# Patient Record
Sex: FEMALE
[De-identification: names, ages, dates, MRNs, and addresses within clinical notes are randomized per-mention and may not be internally consistent; named-entity substitution may affect disease eponyms.]

---

## 2021-10-18 ENCOUNTER — LABORATORY RESULT (OUTPATIENT)
Age: 51
End: 2021-10-18

## 2021-10-18 ENCOUNTER — TRANSCRIPTION ENCOUNTER (OUTPATIENT)
Age: 51
End: 2021-10-18

## 2021-10-18 ENCOUNTER — APPOINTMENT (OUTPATIENT)
Dept: FAMILY MEDICINE | Facility: CLINIC | Age: 51
End: 2021-10-18
Payer: COMMERCIAL

## 2021-10-18 VITALS
TEMPERATURE: 97.6 F | DIASTOLIC BLOOD PRESSURE: 70 MMHG | SYSTOLIC BLOOD PRESSURE: 114 MMHG | OXYGEN SATURATION: 99 % | WEIGHT: 135 LBS | HEIGHT: 65 IN | BODY MASS INDEX: 22.49 KG/M2 | HEART RATE: 63 BPM

## 2021-10-18 DIAGNOSIS — Z63.4 DISAPPEARANCE AND DEATH OF FAMILY MEMBER: ICD-10-CM

## 2021-10-18 DIAGNOSIS — Z82.69 FAMILY HISTORY OF OTHER DISEASES OF THE MUSCULOSKELETAL SYSTEM AND CONNECTIVE TISSUE: ICD-10-CM

## 2021-10-18 DIAGNOSIS — Z82.49 FAMILY HISTORY OF ISCHEMIC HEART DISEASE AND OTHER DISEASES OF THE CIRCULATORY SYSTEM: ICD-10-CM

## 2021-10-18 DIAGNOSIS — Z81.8 FAMILY HISTORY OF OTHER MENTAL AND BEHAVIORAL DISORDERS: ICD-10-CM

## 2021-10-18 DIAGNOSIS — U07.1 COVID-19: ICD-10-CM

## 2021-10-18 DIAGNOSIS — Z86.010 PERSONAL HISTORY OF COLONIC POLYPS: ICD-10-CM

## 2021-10-18 DIAGNOSIS — B00.2 HERPESVIRAL GINGIVOSTOMATITIS AND PHARYNGOTONSILLITIS: ICD-10-CM

## 2021-10-18 DIAGNOSIS — Z82.3 FAMILY HISTORY OF STROKE: ICD-10-CM

## 2021-10-18 PROCEDURE — 36415 COLL VENOUS BLD VENIPUNCTURE: CPT

## 2021-10-18 PROCEDURE — 99386 PREV VISIT NEW AGE 40-64: CPT | Mod: 25

## 2021-10-18 PROCEDURE — 99000 SPECIMEN HANDLING OFFICE-LAB: CPT

## 2021-10-18 SDOH — SOCIAL STABILITY - SOCIAL INSECURITY: DISSAPEARANCE AND DEATH OF FAMILY MEMBER: Z63.4

## 2021-10-18 NOTE — ASSESSMENT
[FreeTextEntry1] : MAYTE LYN is a 51 year old female here for a physical exam and to establish primary care and for f/u of above noted medical issues. \par \par

## 2021-10-18 NOTE — PHYSICAL EXAM

## 2021-10-18 NOTE — HISTORY OF PRESENT ILLNESS
[de-identified] : Her last PE was last year\par \par Her last tetanus shot was \par Shingrix -- never\par Had Moderna COVID vacc series\par \par Her last dentist visit was within past 6 months\par Her last eye doctor appointment was within past year\par \par Her diet is clean/healthful overall usually with occas periods of indulgence\par Exercises regularly \par \par Her last colonoscopy was 50-51 yrs of age, polyps, rpt due by 5 yrs. Also had EGD and was wnl, Dr. Bliss \par Her last mammogram was 2020 ([facility]), plans for near future \par Her last gyn exam was 2021 (Dr. Booth)-- she has Rx for labs to be done in near future incl hormone levels and CBC/iron so we will do today with her labs\par \par Has been told she is not anemic but has low iron and this is likely related to heavier periods for years. UTD on colonoscopy. Still getting menses Q1-2 months. Menses getting a bit less heavy now but still about 3 days of heavy flow and then 2-3 days of lighter flow. Gyn has d/w her ablation vs. IUD as treatment option but she prefers to take a more conservative approach and not add meds/do procedures at this point as is close to menopause \par \par Has migraines, mainly triggered by menstrual cycles. takes Zomig and Cambia ASAP at onset of migraines (approx 2x/month). Has tried Magnesium in past but not very helpful \par \par Has oral HSV and it sounds like she takes Valacyclovir 500 mg BID x 3-5 days and starts treating at first sign of tingling but usually still gets a visible sore so we disc option of trying 1 gm 2 po BID and she is willing to do this. Also occas takes L Lysine \par \par Her   of complications from COVID in 2020. 17 yo daughter at [facility]. No grief therapy. Exercising regularly. No yoga or meditation and we disc those and she might consider. Also might consider therapy  \par \par Several years ago (in her early 50s) she saw Dr. Ileana Peraza as Dr. Murray noted an enlarged thyroid gland. Dr. Peraza die thyroid US and showed a nodule that did not require biopsy. TFTs were wnl at that time. Has not had f/u since then

## 2021-10-18 NOTE — PLAN
[FreeTextEntry1] : Reviewed age-appropriate preventive screening tests with patient. UTD on gyn, colonoscopy. Due for mammogram and plans for near future. Had ECG last year and was wnl so she defers on this for this year. \par \par Shingrix and flu vacc revd/recommended. She defers for now but can RTO if/when reconsiders/checks with insurance about coverage. \par \par Thyroid US ordered to f/u on prior thyroid nodule and will check TSH with labs. If US is reassuring (<1-1.5 cm, benign appearance) and TSH wnl ok to defer on f/u with Endo unless she wishes to do (she prefers to defer on addtl medical visits unless labs/US show need to go and in that case she would definitely go for f/u)\par \par Discussed clean eating (eg Mediterranean style eating plan) and regular exercise/staying as physically active as possible.\par \par Reviewed importance of good self care (eg meditation, yoga, adequate rest, regular exercise, magnesium, clean eating etc). Consider grief therapy. \par \par Treat at first symptom of oral HSV outbreak. Valtrex dosing revd, and can also try L Lysine. Common triggers revd and she tries to avoid these. \par \par Revd common triggers for migraine and she tries to get good rest and eat cleanly and keep stress levels in check and stay well hydrated. Cont meds as needed. Consider trial of Migralief and/or butterbur.\par \par Eat iron rich foods regularly, consider use of OTC iron supplement any day has menstrual bleeding if current labs show low iron levels. Consider IUD (3 yr Kyleena would likely be enough to get her to menopause) if signif abnormal labs or if she would like to treat menorrhagia and she can f/u with gyn if desires this. \par \par Next PE in 1-2 yrs.

## 2021-10-18 NOTE — REVIEW OF SYSTEMS
[Negative] : Heme/Lymph [Nasal Discharge] : no nasal discharge [Sore Throat] : no sore throat [Postnasal Drip] : no postnasal drip [Headache] : headache [Dizziness] : no dizziness [Fainting] : no fainting [Unsteady Walking] : no ataxia [Anxiety] : no anxiety [Depression] : no depression [FreeTextEntry4] : oral HSV, approx 3-4 outbreaks per year [de-identified] : walks in [facility] and uses tick prevention measures but would like Lyme test with labs. No acute Lyme sxs or recent known tick bites [de-identified] : migraines approx 2x/mo, see HPI [de-identified] : +grief related to her  dying of complications from COVID in 4/2020, see HPI

## 2021-10-19 ENCOUNTER — TRANSCRIPTION ENCOUNTER (OUTPATIENT)
Age: 51
End: 2021-10-19

## 2021-10-19 LAB
ALBUMIN SERPL ELPH-MCNC: 4.7 G/DL
ALP BLD-CCNC: 54 U/L
ALT SERPL-CCNC: 12 U/L
ANION GAP SERPL CALC-SCNC: 10 MMOL/L
AST SERPL-CCNC: 11 U/L
B BURGDOR IGG+IGM SER QL IB: NORMAL
BASOPHILS # BLD AUTO: 0.1 K/UL
BASOPHILS NFR BLD AUTO: 1.3 %
BILIRUB SERPL-MCNC: <0.2 MG/DL
BUN SERPL-MCNC: 18 MG/DL
CALCIUM SERPL-MCNC: 9.9 MG/DL
CHLORIDE SERPL-SCNC: 106 MMOL/L
CHOLEST SERPL-MCNC: 186 MG/DL
CO2 SERPL-SCNC: 25 MMOL/L
CREAT SERPL-MCNC: 0.91 MG/DL
EOSINOPHIL # BLD AUTO: 0.17 K/UL
EOSINOPHIL NFR BLD AUTO: 2.2 %
ESTRADIOL SERPL-MCNC: 140 PG/ML
FERRITIN SERPL-MCNC: 13 NG/ML
FSH SERPL-MCNC: 3.9 IU/L
GLUCOSE SERPL-MCNC: 91 MG/DL
HCT VFR BLD CALC: 41.1 %
HDLC SERPL-MCNC: 86 MG/DL
HGB BLD-MCNC: 12.3 G/DL
IMM GRANULOCYTES NFR BLD AUTO: 0.3 %
IRON SATN MFR SERPL: 6 %
IRON SERPL-MCNC: 27 UG/DL
LDLC SERPL CALC-MCNC: 91 MG/DL
LYMPHOCYTES # BLD AUTO: 1.46 K/UL
LYMPHOCYTES NFR BLD AUTO: 18.9 %
MAN DIFF?: NORMAL
MCHC RBC-ENTMCNC: 27.8 PG
MCHC RBC-ENTMCNC: 29.9 GM/DL
MCV RBC AUTO: 92.8 FL
MONOCYTES # BLD AUTO: 0.36 K/UL
MONOCYTES NFR BLD AUTO: 4.7 %
NEUTROPHILS # BLD AUTO: 5.62 K/UL
NEUTROPHILS NFR BLD AUTO: 72.6 %
NONHDLC SERPL-MCNC: 100 MG/DL
PLATELET # BLD AUTO: 306 K/UL
POTASSIUM SERPL-SCNC: 5.2 MMOL/L
PROT SERPL-MCNC: 6.5 G/DL
RBC # BLD: 4.43 M/UL
RBC # FLD: 13.8 %
SODIUM SERPL-SCNC: 141 MMOL/L
TIBC SERPL-MCNC: 432 UG/DL
TRIGL SERPL-MCNC: 47 MG/DL
TSH SERPL-ACNC: 0.78 UIU/ML
UIBC SERPL-MCNC: 405 UG/DL
WBC # FLD AUTO: 7.73 K/UL

## 2022-01-04 ENCOUNTER — FORM ENCOUNTER (OUTPATIENT)
Age: 52
End: 2022-01-04

## 2022-01-04 DIAGNOSIS — Z86.39 PERSONAL HISTORY OF OTHER ENDOCRINE, NUTRITIONAL AND METABOLIC DISEASE: ICD-10-CM

## 2022-01-10 ENCOUNTER — TRANSCRIPTION ENCOUNTER (OUTPATIENT)
Age: 52
End: 2022-01-10

## 2022-01-10 PROBLEM — Z86.39 HISTORY OF THYROID NODULE: Status: RESOLVED | Noted: 2021-10-18 | Resolved: 2022-01-10

## 2022-01-12 ENCOUNTER — NON-APPOINTMENT (OUTPATIENT)
Age: 52
End: 2022-01-12

## 2022-05-12 ENCOUNTER — APPOINTMENT (OUTPATIENT)
Dept: ORTHOPEDIC SURGERY | Facility: CLINIC | Age: 52
End: 2022-05-12

## 2022-05-19 ENCOUNTER — EMERGENCY (EMERGENCY)
Facility: HOSPITAL | Age: 52
LOS: 0 days | Discharge: HOME | End: 2022-05-19
Attending: EMERGENCY MEDICINE
Payer: COMMERCIAL

## 2022-05-19 VITALS — HEIGHT: 64 IN | WEIGHT: 139.99 LBS

## 2022-05-19 VITALS
RESPIRATION RATE: 16 BRPM | SYSTOLIC BLOOD PRESSURE: 119 MMHG | OXYGEN SATURATION: 98 % | HEART RATE: 69 BPM | TEMPERATURE: 98 F | DIASTOLIC BLOOD PRESSURE: 79 MMHG

## 2022-05-19 DIAGNOSIS — R91.8 OTHER NONSPECIFIC ABNORMAL FINDING OF LUNG FIELD: ICD-10-CM

## 2022-05-19 DIAGNOSIS — S09.90XA UNSPECIFIED INJURY OF HEAD, INITIAL ENCOUNTER: ICD-10-CM
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
## 2022-05-19 DIAGNOSIS — S06.0X0A CONCUSSION WITHOUT LOSS OF CONSCIOUSNESS, INITIAL ENCOUNTER: ICD-10-CM

## 2022-05-19 PROCEDURE — 70450 CT HEAD/BRAIN W/O DYE: CPT | Mod: 26,MA

## 2022-05-19 PROCEDURE — 72125 CT NECK SPINE W/O DYE: CPT | Mod: MA

## 2022-05-19 PROCEDURE — 71250 CT THORAX DX C-: CPT | Mod: MA

## 2022-05-19 PROCEDURE — 71250 CT THORAX DX C-: CPT | Mod: 26,MA

## 2022-05-19 PROCEDURE — 99284 EMERGENCY DEPT VISIT MOD MDM: CPT | Mod: 25

## 2022-05-19 PROCEDURE — 72125 CT NECK SPINE W/O DYE: CPT | Mod: 26,MA

## 2022-05-19 PROCEDURE — 99285 EMERGENCY DEPT VISIT HI MDM: CPT

## 2022-05-19 PROCEDURE — 70450 CT HEAD/BRAIN W/O DYE: CPT | Mod: MA

## 2022-05-19 NOTE — ED STATDOCS - ATTENDING APP SHARED VISIT CONTRIBUTION OF CARE
I, Chelsea Mercado MD,  performed the initial face to face bedside interview with this patient regarding history of present illness, review of symptoms and relevant past medical, social and family history.  I completed an independent physical examination.  I was the initial provider who evaluated this patient.   I personally saw the patient and performed a substantive portion of the visit including all aspects of the medical decision making.  I have signed out the follow up of any pending tests (i.e. labs, radiological studies) to the DIPTI.  I have communicated the patient’s plan of care and disposition with the DIPTI.  The history, relevant review of systems, past medical and surgical history, medical decision making, and physical examination was documented by the scribe in my presence and I attest to the accuracy of the documentation.

## 2022-05-19 NOTE — ED STATDOCS - PATIENT PORTAL LINK FT
You can access the FollowMyHealth Patient Portal offered by [facility] by registering at the following website: [email or web address]. By joining [facility]’s FollowMyHealth portal, you will also be able to view your health information using other applications (apps) compatible with our system.

## 2022-05-19 NOTE — ED STATDOCS - NS ED ATTENDING STATEMENT MOD
This was a shared visit with the DIPTI. I reviewed and verified the documentation and independently performed the documented:
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
## 2022-05-19 NOTE — ED STATDOCS - CARE PLAN
1 Principal Discharge DX:	Head injury  Secondary Diagnosis:	Concussion   Principal Discharge DX:	Head injury  Secondary Diagnosis:	Concussion  Secondary Diagnosis:	Opacity noted on imaging study
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
## 2022-05-19 NOTE — ED STATDOCS - NEUROLOGICAL, MLM
sensation is normal and strength is normal. CN 2-12 intact. 5/5 strength throughout. -Romberg, finger to nose normal, rapid alternating movements intact.

## 2022-05-19 NOTE — ED STATDOCS - CARE PROVIDER_API CALL
Danie Mercado; PhD)  Neurosurgery  [address], [address]nd [place] [address]  Phone: [phone]  Fax: [phone]  Follow Up Time:
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
## 2022-06-17 ENCOUNTER — NON-APPOINTMENT (OUTPATIENT)
Age: 52
End: 2022-06-17

## 2022-06-17 PROBLEM — Z78.9 OTHER SPECIFIED HEALTH STATUS: Chronic | Status: ACTIVE | Noted: 2022-05-22

## 2022-08-03 ENCOUNTER — APPOINTMENT (OUTPATIENT)
Dept: PULMONOLOGY | Facility: CLINIC | Age: 52
End: 2022-08-03

## 2022-08-04 ENCOUNTER — APPOINTMENT (OUTPATIENT)
Dept: PULMONOLOGY | Facility: CLINIC | Age: 52
End: 2022-08-04

## 2022-08-04 ENCOUNTER — NON-APPOINTMENT (OUTPATIENT)
Age: 52
End: 2022-08-04

## 2022-08-04 VITALS
TEMPERATURE: 97.3 F | WEIGHT: 135 LBS | SYSTOLIC BLOOD PRESSURE: 122 MMHG | DIASTOLIC BLOOD PRESSURE: 74 MMHG | RESPIRATION RATE: 16 BRPM | OXYGEN SATURATION: 98 % | HEART RATE: 67 BPM | HEIGHT: 64 IN | BODY MASS INDEX: 23.05 KG/M2

## 2022-08-04 PROCEDURE — 94010 BREATHING CAPACITY TEST: CPT

## 2022-08-04 PROCEDURE — 99204 OFFICE O/P NEW MOD 45 MIN: CPT | Mod: 25

## 2022-08-04 NOTE — PROCEDURE
[FreeTextEntry1] : SPI performed in office show normal spirometry\par FEV: 94\par FEV1/FVC Ratio: 93\par WGI55-02%: 88\par \par

## 2022-08-04 NOTE — PHYSICAL EXAM
[No Acute Distress] : no acute distress [Normal Oropharynx] : normal oropharynx [II] : Mallampati Class: II [Normal Appearance] : normal appearance [Normal Rate/Rhythm] : normal rate/rhythm [Normal S1, S2] : normal s1, s2 [No Resp Distress] : no resp distress [Clear to Auscultation Bilaterally] : clear to auscultation bilaterally [No Abnormalities] : no abnormalities [Benign] : benign [Normal Color/ Pigmentation] : normal color/ pigmentation [No Focal Deficits] : no focal deficits [Oriented x3] : oriented x3 [Normal Affect] : normal affect

## 2022-08-04 NOTE — HISTORY OF PRESENT ILLNESS
[Never] : never [TextBox_4] : Ms. Mckinney is 52 year old female with history of migraine HA, thyroid nodule, MEÑO who presents to office for initial pulmonary evaluation. \par \par Her chief concern is abnormal chest CT.\par \par Patient reports she was evaluated in ER for possible concussion on 5/19/2022. CT of Brain showed abnormality of left upper lobe. Patient had CT chest which showed dense opacity in left upper lobe and was advised to get pulmonary evaluation. She states she is in normal state of health. \par \par Patient is COVID vaccinated and boosted. She reports COVID infection X 2 3/2020 and 7/2022 with mild symptoms. \par \par Patient denies seasonal allergies or GERD. She reports she sleeps for 6-7 hours and feels rested, appetite is good and weight is stable. \par \par Patient denies SOB@ rest or exertion, fever, chills, cough, wheezing, nasal congestion, runny nose or heart burn. \par \par

## 2022-08-04 NOTE — ASSESSMENT
[FreeTextEntry1] : Ms. Mckinney is 52 year old female with history of migraine HA, thyroid nodule, MEÑO who presents to office for initial pulmonary evaluation. \par \par Her chief concern is abnormal chest CT.\par \par 1.Abnormal Chest CT\par -add nodify\par -add PET CT (list of [facility] imaging centers given at checkout)\par -add lab ( list of [facility] labs given at checkout)\par \par Discussed treatment plan with Dr. Norton, he is in agreement with treatment plan. \par \par Patient to follow up 2 months.\par Patient to call with further questions and concerns.\par Patient verbalizes understanding of care plan and is agreeable.\par

## 2022-08-08 LAB
M TB IFN-G BLD-IMP: NEGATIVE
QUANTIFERON TB PLUS MITOGEN MINUS NIL: 4.85 IU/ML
QUANTIFERON TB PLUS NIL: 0.02 IU/ML
QUANTIFERON TB PLUS TB1 MINUS NIL: 0.03 IU/ML
QUANTIFERON TB PLUS TB2 MINUS NIL: 0.01 IU/ML

## 2022-08-09 ENCOUNTER — TRANSCRIPTION ENCOUNTER (OUTPATIENT)
Age: 52
End: 2022-08-09

## 2022-08-09 ENCOUNTER — NON-APPOINTMENT (OUTPATIENT)
Age: 52
End: 2022-08-09

## 2022-08-15 ENCOUNTER — TRANSCRIPTION ENCOUNTER (OUTPATIENT)
Age: 52
End: 2022-08-15

## 2022-08-18 ENCOUNTER — TRANSCRIPTION ENCOUNTER (OUTPATIENT)
Age: 52
End: 2022-08-18

## 2022-08-30 ENCOUNTER — NON-APPOINTMENT (OUTPATIENT)
Age: 52
End: 2022-08-30

## 2022-09-08 ENCOUNTER — APPOINTMENT (OUTPATIENT)
Dept: NUCLEAR MEDICINE | Facility: CLINIC | Age: 52
End: 2022-09-08

## 2022-09-23 ENCOUNTER — APPOINTMENT (OUTPATIENT)
Dept: NUCLEAR MEDICINE | Facility: CLINIC | Age: 52
End: 2022-09-23

## 2022-09-30 ENCOUNTER — APPOINTMENT (OUTPATIENT)
Dept: NUCLEAR MEDICINE | Facility: CLINIC | Age: 52
End: 2022-09-30

## 2022-09-30 ENCOUNTER — OUTPATIENT (OUTPATIENT)
Dept: OUTPATIENT SERVICES | Facility: HOSPITAL | Age: 52
LOS: 1 days | End: 2022-09-30

## 2022-09-30 DIAGNOSIS — R93.89 ABNORMAL FINDINGS ON DIAGNOSTIC IMAGING OF OTHER SPECIFIED BODY STRUCTURES: ICD-10-CM

## 2022-09-30 PROCEDURE — 78815 PET IMAGE W/CT SKULL-THIGH: CPT | Mod: 26,PI

## 2022-10-04 ENCOUNTER — APPOINTMENT (OUTPATIENT)
Dept: PULMONOLOGY | Facility: CLINIC | Age: 52
End: 2022-10-04

## 2022-10-04 VITALS
SYSTOLIC BLOOD PRESSURE: 120 MMHG | OXYGEN SATURATION: 98 % | DIASTOLIC BLOOD PRESSURE: 78 MMHG | WEIGHT: 140 LBS | HEART RATE: 62 BPM | HEIGHT: 64 IN | RESPIRATION RATE: 16 BRPM | BODY MASS INDEX: 23.9 KG/M2 | TEMPERATURE: 97.6 F

## 2022-10-04 PROCEDURE — 99214 OFFICE O/P EST MOD 30 MIN: CPT

## 2022-10-04 NOTE — ASSESSMENT
[FreeTextEntry1] : Ms. Mckinney is 52 year old female with history of migraine HA, thyroid nodule, MEÑO who presents to office for follow up pulmonary evaluation. \par \par Her chief concern is follow up.\par \par 1.Abnormal Chest CT/ pulmonary nodules\par -CTS evaluation \par (Dr. Minor contact info given to patient) \par \par 2. Enlarged Uterus\par -advised patient to follow up with GYN. \par \par Patient to follow up 6 months.\par Patient to call with further questions and concerns.\par Patient verbalizes understanding of care plan and is agreeable.\par

## 2022-10-04 NOTE — DISCUSSION/SUMMARY
[FreeTextEntry1] : 8/4/2022 -TB - negative.\par \par 8/9/2022 - Nodify - 10% risk of malignancy \par \par 9/30/2022 - PET CT - mildly FDG avid irregular JAYA opacity, unchanged and better evaluated on comparison CT. Differential includes benign, and malignant etiologies. \par Enlarged uterus with nonspecific minimal diffuse FDG activity in the uterine cavity.

## 2022-10-04 NOTE — HISTORY OF PRESENT ILLNESS
(0) independent [Never] : never [TextBox_4] : Ms. Mckinney is 52 year old female with history of migraine HA, thyroid nodule, MEÑO who presents to office for follow up pulmonary evaluation. \par \par Her chief concern is follow up.\par \par Patient reports she had labs done and received results. She reports she also had PET CT done. Patient reports she is in normal state of health. She reports sleeps well, appetite is good and weight is stable. Patient reports she received EOB for labs test and has cleared issue with Nodify. \par \par Patient denies SOB@ rest or exertion, fever, chills, cough, wheezing, nasal congestion, runny nose or heart burn. \par

## 2022-10-07 ENCOUNTER — TRANSCRIPTION ENCOUNTER (OUTPATIENT)
Age: 52
End: 2022-10-07

## 2022-10-10 ENCOUNTER — TRANSCRIPTION ENCOUNTER (OUTPATIENT)
Age: 52
End: 2022-10-10

## 2022-10-27 ENCOUNTER — NON-APPOINTMENT (OUTPATIENT)
Age: 52
End: 2022-10-27

## 2022-10-27 ENCOUNTER — APPOINTMENT (OUTPATIENT)
Dept: THORACIC SURGERY | Facility: CLINIC | Age: 52
End: 2022-10-27

## 2022-10-27 VITALS
OXYGEN SATURATION: 98 % | HEIGHT: 64 IN | SYSTOLIC BLOOD PRESSURE: 138 MMHG | BODY MASS INDEX: 23.9 KG/M2 | HEART RATE: 63 BPM | WEIGHT: 140 LBS | DIASTOLIC BLOOD PRESSURE: 83 MMHG

## 2022-10-27 DIAGNOSIS — G43.909 MIGRAINE, UNSPECIFIED, NOT INTRACTABLE, W/OUT STATUS MIGRAINOSUS: ICD-10-CM

## 2022-10-27 DIAGNOSIS — Z77.22 CONTACT WITH AND (SUSPECTED) EXPOSURE TO ENVIRONMENTAL TOBACCO SMOKE (ACUTE) (CHRONIC): ICD-10-CM

## 2022-10-27 DIAGNOSIS — Z80.1 FAMILY HISTORY OF MALIGNANT NEOPLASM OF TRACHEA, BRONCHUS AND LUNG: ICD-10-CM

## 2022-10-27 DIAGNOSIS — G37.3 ACUTE TRANSVERSE MYELITIS IN DEMYELINATING DISEASE OF CENTRAL NERVOUS SYSTEM: ICD-10-CM

## 2022-10-27 PROCEDURE — 99205 OFFICE O/P NEW HI 60 MIN: CPT

## 2022-10-28 NOTE — DATA REVIEWED
[FreeTextEntry1] : I have independently reviewed the following:\par CT Chest on 5/19/22\par Spirometry on 8/4/22: %, FEV1 94%.\par PET/CT on 9/30/22

## 2022-10-28 NOTE — ASSESSMENT
[FreeTextEntry1] : Ms. MAYTE LYN, 52 year old female, never a smoker, but history of second hand exposure as a child, Migraine headaches, Transverse Myelitis (2014), thyroid nodules (followed by PCP). May, 2022 incidentally found to have JAYA opacity during CT workup for fall. \par \par CT Chest on 5/19/22:\par - irregular JAYA opacity measuring 1.4 x 1 x 1.8cm with minimal adjacent groundglass associated w/ a cluster of few cysts along the inferior margin of this opacity some of which have a tubular appearance, likely representing bronchiectasis\par \par Spirometry on 8/4/22: %, FEV1 94%.\par \par PET/CT on 9/30/22:\par - 1.4 x 1 x 1.8cm SUV=2.7 JAYA nodule (image 61)\par - enlarged uterus w/ nonspecific minimal diffuse FDG activity in the uterine cavity\par \par I have reviewed the patient's medical records and diagnostic images at time of this office consultation and have made the following recommendation:\par 1. JAYA nodule is suspicious. Discussed options of CT guided needle biopsy versus surgical biopsy for diagnosis.  Risks/benefits of both reviewed. Discussed risk of possible disease progression without definitive diagnosis and further treatment. At this time, patient would like to consider all options, and will contact office if she decides to proceed with care. \par 2. If proceeding with surgery, will book for Robotic assisted Left VATS, Left upper lobe wedge, possible segmentectomy, possible lobectomy. Medical clearance as well as PFTs required prior to surgery. \par \par Recommendations reviewed with patient during this office visit, and all questions answered; Patient instructed on the importance of follow up and verbalizes understanding.\par \par \par I personally performed the services described in the documentation, reviewed the documentation recorded by the scribe in my presence and it accurately and completely records my words and actions.\par \par I, Bebe Barajas, ANP-C, am scribing for and the presence of MAYELIN Berkowitz, the following sections HISTORY OF PRESENT ILLNESS, PAST MEDICAL/FAMILY/SOCIAL HISTORY; REVIEW OF SYSTEMS; VITAL SIGNS; PHYSICAL EXAM; DISPOSITION.\par \par \par \par

## 2022-10-28 NOTE — CONSULT LETTER
[Consult Letter:] : I had the pleasure of evaluating your patient, [unfilled]. [( Thank you for referring [unfilled] for consultation for _____ )] : Thank you for referring [unfilled] for consultation for [unfilled] [Please see my note below.] : Please see my note below. [Consult Closing:] : Thank you very much for allowing me to participate in the care of this patient.  If you have any questions, please do not hesitate to contact me. [Sincerely,] : Sincerely, [FreeTextEntry2] : Dr. Dale Norton (Pulm/Referring)\par Dr. Melba Raphael (PCP) [FreeTextEntry3] : Bhavik Minor MD, MPH \par System Director of Thoracic Surgery \par Director of Comprehensive Lung and Foregut [facility] \par Professor Cardiovascular & Thoracic Surgery  \par [facility] [employer] [facility]\par \par [facility]\par [address]\par Oncology [address]\[place] [address]\par Tel: [phone]\par Fax: [phone]\par

## 2022-10-28 NOTE — PHYSICAL EXAM
[Fully active, able to carry on all pre-disease performance without restriction] : Status 0 - Fully active, able to carry on all pre-disease performance without restriction [General Appearance - Alert] : alert [General Appearance - In No Acute Distress] : in no acute distress [General Appearance - Well Nourished] : well nourished [PERRL With Normal Accommodation] : pupils were equal in size, round, and reactive to light [Extraocular Movements] : extraocular movements were intact [Outer Ear] : the ears and nose were normal in appearance [Hearing Threshold Finger Rub Not [place]] : hearing was normal [Both Tympanic Membranes Were Examined] : both tympanic membranes were normal [Neck Appearance] : the appearance of the neck was normal [Neck Cervical Mass (___cm)] : no neck mass was observed [] : no respiratory distress [Respiration, Rhythm And Depth] : normal respiratory rhythm and effort [Exaggerated Use Of Accessory Muscles For Inspiration] : no accessory muscle use [Auscultation Breath Sounds / Voice Sounds] : lungs were clear to auscultation bilaterally [Heart Rate And Rhythm] : heart rate was normal and rhythm regular [Examination Of The Chest] : the chest was normal in appearance [Chest Visual Inspection Thoracic Asymmetry] : no chest asymmetry [Diminished Respiratory Excursion] : normal chest expansion [2+] : left 2+ [Breast Appearance] : normal in appearance [Breast Palpation Mass] : no palpable masses [Bowel Sounds] : normal bowel sounds [Abdomen Soft] : soft [Abdomen Tenderness] : non-tender [Cervical Lymph Nodes Enlarged Posterior Bilaterally] : posterior cervical [Cervical Lymph Nodes Enlarged Anterior Bilaterally] : anterior cervical [Supraclavicular Lymph Nodes Enlarged Bilaterally] : supraclavicular [No CVA Tenderness] : no ~M costovertebral angle tenderness [No Spinal Tenderness] : no spinal tenderness [Involuntary Movements] : no involuntary movements were seen [Skin Color & Pigmentation] : normal skin color and pigmentation [No Focal Deficits] : no focal deficits [Oriented To Time, Place, And Person] : oriented to person, place, and time [FreeTextEntry1] : Deferred

## 2022-10-28 NOTE — HISTORY OF PRESENT ILLNESS
[FreeTextEntry1] : Ms. MAYTE LYN, 52 year old female, never a smoker, but history of second hand exposure as a child, Migraine headaches, Transverse Myelitis (2014), thyroid nodules (followed by PCP). May, 2022 incidentally found to have JAYA opacity during CT workup for fall. \par \par CT Chest on 5/19/22:\par - irregular JAYA opacity measuring 1.4 x 1 x 1.8cm with minimal adjacent groundglass associated w/ a cluster of few cysts along the inferior margin of this opacity some of which have a tubular appearance, likely representing bronchiectasis\par \par Spirometry on 8/4/22: %, FEV1 94%.\par Quantiferon 8/4/2022: Negative\par \par PET/CT on 9/30/22:\par - 1.4 x 1 x 1.8cm SUV=2.7 JAYA nodule (image 61)\par - enlarged uterus w/ nonspecific minimal diffuse FDG activity in the uterine cavity\par \par Patient is here today for CT Sx consultation, referred by Dr. Norton. Today, patient denies worsening SOB, chest pain, cough, hemoptysis, fever, chills, night sweats, lightheadedness or dizziness. No muscle weakness, or motility problems. No numbness or tingling.\par \par

## 2022-10-30 ENCOUNTER — FORM ENCOUNTER (OUTPATIENT)
Age: 52
End: 2022-10-30

## 2022-12-27 ENCOUNTER — FORM ENCOUNTER (OUTPATIENT)
Age: 52
End: 2022-12-27

## 2023-04-10 ENCOUNTER — APPOINTMENT (OUTPATIENT)
Dept: FAMILY MEDICINE | Facility: CLINIC | Age: 53
End: 2023-04-10
Payer: COMMERCIAL

## 2023-04-10 ENCOUNTER — LABORATORY RESULT (OUTPATIENT)
Age: 53
End: 2023-04-10

## 2023-04-10 VITALS
BODY MASS INDEX: 26.63 KG/M2 | TEMPERATURE: 98.1 F | SYSTOLIC BLOOD PRESSURE: 118 MMHG | HEART RATE: 72 BPM | OXYGEN SATURATION: 99 % | DIASTOLIC BLOOD PRESSURE: 70 MMHG | HEIGHT: 64 IN | WEIGHT: 156 LBS

## 2023-04-10 DIAGNOSIS — M25.50 PAIN IN UNSPECIFIED JOINT: ICD-10-CM

## 2023-04-10 PROCEDURE — 99396 PREV VISIT EST AGE 40-64: CPT | Mod: 25

## 2023-04-10 PROCEDURE — 36415 COLL VENOUS BLD VENIPUNCTURE: CPT

## 2023-04-10 RX ORDER — DICLOFENAC POTASSIUM 50 MG/1
50 POWDER, FOR SOLUTION ORAL
Qty: 9 | Refills: 3 | Status: ACTIVE | COMMUNITY
Start: 1900-01-01 | End: 1900-01-01

## 2023-04-10 RX ORDER — VALACYCLOVIR HYDROCHLORIDE 500 MG/1
500 TABLET, FILM COATED ORAL
Refills: 0 | Status: DISCONTINUED | COMMUNITY
End: 2023-04-10

## 2023-04-10 NOTE — PHYSICAL EXAM
[No Acute Distress] : no acute distress [Well Nourished] : well nourished [Well Developed] : well developed [Well-Appearing] : well-appearing [Normal Sclera/Conjunctiva] : normal sclera/conjunctiva [EOMI] : extraocular movements intact [Normal Outer Ear/Nose] : the outer ears and nose were normal in appearance [No JVD] : no jugular venous distention [No Lymphadenopathy] : no lymphadenopathy [Supple] : supple [Thyroid Normal, No Nodules] : the thyroid was normal and there were no nodules present [No Respiratory Distress] : no respiratory distress  [No Accessory Muscle Use] : no accessory muscle use [Clear to Auscultation] : lungs were clear to auscultation bilaterally [Normal Rate] : normal rate  [Regular Rhythm] : with a regular rhythm [Normal S1, S2] : normal S1 and S2 [No Murmur] : no murmur heard [No Carotid Bruits] : no carotid bruits [No Varicosities] : no varicosities [Pedal Pulses Present] : the pedal pulses are present [No Edema] : there was no peripheral edema [No Extremity Clubbing/Cyanosis] : no extremity clubbing/cyanosis [Soft] : abdomen soft [Non Tender] : non-tender [Non-distended] : non-distended [No Masses] : no abdominal mass palpated [No HSM] : no HSM [Normal Bowel Sounds] : normal bowel sounds [No Joint Swelling] : no joint swelling [Grossly Normal Strength/Tone] : grossly normal strength/tone [No Rash] : no rash [Coordination Grossly Intact] : coordination grossly intact [No Focal Deficits] : no focal deficits [Normal Gait] : normal gait [Normal Affect] : the affect was normal [Normal Insight/Judgement] : insight and judgment were intact [de-identified] : slender frame [de-identified] : no s/sxs acute synovitis [de-identified] : well healing (3 small, about 1-2 inch long) surgical scars L flank/thorax [de-identified] : good eye contact

## 2023-04-10 NOTE — ASSESSMENT
[FreeTextEntry1] : GLENNY LYN is a 53 year old female here for a physical exam.  She is also here to follow up on medical issues as noted above.\par \par Glenny has a history of iron deficiency, migraine headaches, oral HSV and thyroid nodule, as well as 1.8 cm JAYA pulm nodule dx 2022 that turned out to be malignant and she is s/p resection of this 1/2023 and has healed well from this surg. SHe also has signif diffuse joint pains.

## 2023-04-10 NOTE — HISTORY OF PRESENT ILLNESS
[FreeTextEntry1] : MAYTE LYN is a 53 year old female here for a physical exam.\par  [de-identified] : \par Her last physical exam was 10/2021\par \par Vaccines:\par Tetanus is up to date; last 2020\par Shingrix is NOT up to date\par COVID vaccine is up to date\par \par Her last dentist visit was less than one year ago\par Her last eye doctor appointment was less than one year ago\par Her last dermatologist visit was less than one year ago\par \par GYN visit is up to date; last 9/2022 (Dr. Fan'ss team), had severe hot flashes in 2022\par Mammogram is up to date; last in 4/2023 ([facility]) stable/benign\par Colon cancer screening is up to date; colonoscopy at age 50-51, 5yr f/u recommended. Also had EGD and was wnl (Dr. Bliss)  \par \par Her diet is healthy overall , has been stress eating a bit, going to try to get back on track \par Exercise: walking fairly often\par \par Glenny has migraines, iron deficiency anemia, oral HSV, thyroid nodule. She was also dx with and treated for early stage lung cancer (left) in past year \par \par Has seen Dr. Ileana Peraza in the past (around early 50s) re thyroid nodules. No biopsy was required at that time. Thyroid US 1/2022 shows stable nodules/cysts\par \par She was also incidentally noted to have a 1.8 cm JAYA pulm nodule on eval in ER Spring 2022 for which she had eval with Dr. Norton/Umberto Doan NP in Fall 2022. Was referred to Dr. Minor CT surg and saw him 10/2022 and was advised JAYA nodule is suspicious and biopsy (CT guided vs robotic assisted left VATS) was recommended\par \par 10/2021 labs showed iron def but no anemia. Recommended iron supplementation and f/u with gyn to consider IUD for heavy menses. She was UTD on colonoscopy at that time. Presents today for repeat labs. No menses since 7/2022 so is nearly menopausal. \par \par She is s/p JAYA segmentectomy 1/2023 at [facility]. Had resection of multiple LN as well and all were benign. Her surg is Dr. Cotto [facility]. At this time no adjuvant therapy is needed. She is being monitored by [facility] team with periodic PET scans and is in a surveillance trial through [facility] for lung cancer survivors. \par \par Is having severe joint pains diffusely through entire body. Fingers, elbows, wrists, knees, hips, ankles. NO red hot swollen joints. No fevers. Had a more sudden onset of sxs than gradual. Sxs worse i nAM and after inactivity and tend to improve a bit with moving around. No known tick bites but walks dog daily and wonders if might have had a tick bite\par \par Is seeing therapist recently to help with life stressors. Tends to be a stress eater and has gained some wgt. Does not feel she needs/wants a daily med for anxiety/depression sxs as wants to try to manage with therapy and as weather is getting better she plans to incr walking/self care measures now

## 2023-04-10 NOTE — HEALTH RISK ASSESSMENT
[Never] : Never [1] : 2) Feeling down, depressed, or hopeless for several days (1) [PHQ-2 Positive] : PHQ-2 Positive [I have developed a follow-up plan documented below in the note.] : I have developed a follow-up plan documented below in the note. [[id number]] : 2

## 2023-04-10 NOTE — REVIEW OF SYSTEMS
[Headache] : headache [Negative] : Heme/Lymph [Joint Pain] : joint pain [Joint Stiffness] : joint stiffness [Nasal Discharge] : no nasal discharge [Sore Throat] : no sore throat [Postnasal Drip] : no postnasal drip [Dizziness] : no dizziness [Fainting] : no fainting [Unsteady Walking] : no ataxia [Suicidal] : not suicidal [Anxiety] : no anxiety [Depression] : depression [FreeTextEntry4] : oral HSV, approx 3-4 outbreaks per year, Valtrex prn works well  [FreeTextEntry9] : see HPI [de-identified] : walks in [facility] and uses tick prevention measures but would like Lyme test with labs.  [de-identified] : migraines approx 2x/mo, Rx med regimen prn works well [de-identified] : +grief/depression sxs due to life stressors of past few yrs, doing therapy and plans to incr self care measures

## 2023-04-10 NOTE — PLAN
[FreeTextEntry1] : Continue all medications as prescribed. Check labs as above. Will adjust any medications based upon lab results.\par \par Reviewed age-appropriate preventive screening tests with patient. UTD on gyn exam and mammogram screening. She believes she is still up to date on colonoscopy until next year as checked with GI office on past year-- we disc that plan is fine as long as no iron def anemia on these labs-- if iron def or anemic she should see GI and have colonoscopy this year and she is agreeable to this plan. She states she had ECG as pre-op at [facility] in 1/2023 and was wnl so does not need \par \par Revd/recommended Shingrix series.\par \par Thyroid US ordered to f/u on prior thyroid nodule and will check TSH with labs. If US is stable and TSH wnl ok to cont to defer on f/u with Endo as per her preference. If signif change in nodules over time she will f/u with Endo for further eval and treatment. \par \par Eat iron rich foods regularly, consider use of OTC iron supplement any day has menstrual bleeding if current labs show low iron levels. If she is again iron def or if is anemic on these labs she also needs to see GI for f/u and eval/updated colonoscopy +/- EGD etc. since can not blame menstrual blood losses at this point as she has not had menses since Summer 2022\par \par Discussed clean eating (eg Mediterranean style eating plan) and regular exercise/staying as physically active as possible. Include balance exercises and strength training and core strengthening exercises for bone health and to decrease risk for falls. \par \par Check labs for joint pain. Recommended Tylenol XS or Arthritis 1-2 pills BID-TID if helpful, ok to use NSAIDs sparingly with food (but revd r/b/se of NSAIDs incl CV, renal and GI and she should limit use as much as possible), regular stretching, heat/ice prn, consider turmeric supplementation, consider CBD cream or oral options, gentle yoga/chair yoga, Pilates, strengthen core muscles, consider chiro and/or massage and/or acupuncture. If these measures are not helpful enough then consider consultation with ortho and/or rheum specialist for further eval and treatment. \par \par Reviewed importance of good self care (e.g. meditation, yoga, adequate rest, regular exercise, magnesium, clean eating, etc.).\par \par PHQ2 screen positive and she has some depression/grief related to multiple life stressors over past few yrs. Cont therapy. Disc option to add SSRI type med or Bupropion and she defers on trial meds for now as wants to work on self care measures  and will let me know if she needs/wants trial of med \par \par Cont f/u with specialists as recommended by them. \par \par Follow up for next physical in one year.\par

## 2023-04-11 ENCOUNTER — TRANSCRIPTION ENCOUNTER (OUTPATIENT)
Age: 53
End: 2023-04-11

## 2023-04-11 LAB
ALBUMIN SERPL ELPH-MCNC: 4.5 G/DL
ALP BLD-CCNC: 66 U/L
ALT SERPL-CCNC: 11 U/L
ANION GAP SERPL CALC-SCNC: 9 MMOL/L
AST SERPL-CCNC: 13 U/L
BASOPHILS # BLD AUTO: 0.05 K/UL
BASOPHILS NFR BLD AUTO: 1.1 %
BILIRUB SERPL-MCNC: 0.2 MG/DL
BUN SERPL-MCNC: 16 MG/DL
CALCIUM SERPL-MCNC: 9.7 MG/DL
CHLORIDE SERPL-SCNC: 105 MMOL/L
CHOLEST SERPL-MCNC: 178 MG/DL
CO2 SERPL-SCNC: 28 MMOL/L
CREAT SERPL-MCNC: 0.8 MG/DL
CRP SERPL-MCNC: <3 MG/L
EGFR: 87 ML/MIN/1.73M2
EOSINOPHIL # BLD AUTO: 0.14 K/UL
EOSINOPHIL NFR BLD AUTO: 3 %
ERYTHROCYTE [SEDIMENTATION RATE] IN BLOOD BY WESTERGREN METHOD: 2 MM/HR
FERRITIN SERPL-MCNC: 25 NG/ML
GLUCOSE SERPL-MCNC: 84 MG/DL
HCT VFR BLD CALC: 41.6 %
HDLC SERPL-MCNC: 72 MG/DL
HGB BLD-MCNC: 12.8 G/DL
IMM GRANULOCYTES NFR BLD AUTO: 0.2 %
IRON SATN MFR SERPL: 17 %
IRON SERPL-MCNC: 67 UG/DL
LDLC SERPL CALC-MCNC: 96 MG/DL
LYMPHOCYTES # BLD AUTO: 1.08 K/UL
LYMPHOCYTES NFR BLD AUTO: 23 %
MAN DIFF?: NORMAL
MCHC RBC-ENTMCNC: 27.3 PG
MCHC RBC-ENTMCNC: 30.8 GM/DL
MCV RBC AUTO: 88.7 FL
MONOCYTES # BLD AUTO: 0.25 K/UL
MONOCYTES NFR BLD AUTO: 5.3 %
NEUTROPHILS # BLD AUTO: 3.17 K/UL
NEUTROPHILS NFR BLD AUTO: 67.4 %
NONHDLC SERPL-MCNC: 106 MG/DL
PLATELET # BLD AUTO: 220 K/UL
POTASSIUM SERPL-SCNC: 4.6 MMOL/L
PROT SERPL-MCNC: 6.3 G/DL
RBC # BLD: 4.69 M/UL
RBC # FLD: 13.6 %
RHEUMATOID FACT SER QL: <10 IU/ML
SODIUM SERPL-SCNC: 143 MMOL/L
TIBC SERPL-MCNC: 386 UG/DL
TRIGL SERPL-MCNC: 48 MG/DL
TSH SERPL-ACNC: 0.91 UIU/ML
UIBC SERPL-MCNC: 319 UG/DL
WBC # FLD AUTO: 4.7 K/UL

## 2023-04-13 ENCOUNTER — TRANSCRIPTION ENCOUNTER (OUTPATIENT)
Age: 53
End: 2023-04-13

## 2023-04-13 LAB — ANA SER IF-ACNC: NEGATIVE

## 2023-05-14 ENCOUNTER — FORM ENCOUNTER (OUTPATIENT)
Age: 53
End: 2023-05-14

## 2023-05-15 ENCOUNTER — TRANSCRIPTION ENCOUNTER (OUTPATIENT)
Age: 53
End: 2023-05-15

## 2023-05-24 ENCOUNTER — APPOINTMENT (OUTPATIENT)
Dept: PULMONOLOGY | Facility: CLINIC | Age: 53
End: 2023-05-24
Payer: COMMERCIAL

## 2023-05-24 ENCOUNTER — APPOINTMENT (OUTPATIENT)
Dept: PULMONOLOGY | Facility: CLINIC | Age: 53
End: 2023-05-24

## 2023-05-24 DIAGNOSIS — R93.89 ABNORMAL FINDINGS ON DIAGNOSTIC IMAGING OF OTHER SPECIFIED BODY STRUCTURES: ICD-10-CM

## 2023-05-24 DIAGNOSIS — Z78.9 OTHER SPECIFIED HEALTH STATUS: ICD-10-CM

## 2023-05-24 DIAGNOSIS — R91.1 SOLITARY PULMONARY NODULE: ICD-10-CM

## 2023-05-24 DIAGNOSIS — R06.02 SHORTNESS OF BREATH: ICD-10-CM

## 2023-05-24 PROCEDURE — 94010 BREATHING CAPACITY TEST: CPT

## 2023-05-24 PROCEDURE — 94729 DIFFUSING CAPACITY: CPT

## 2023-05-24 PROCEDURE — 94727 GAS DIL/WSHOT DETER LNG VOL: CPT

## 2023-05-24 PROCEDURE — 71046 X-RAY EXAM CHEST 2 VIEWS: CPT

## 2023-05-24 PROCEDURE — 99214 OFFICE O/P EST MOD 30 MIN: CPT | Mod: 25

## 2023-05-24 NOTE — REASON FOR VISIT
[Follow-Up] : a follow-up visit [TextBox_44] : abnormal CT (pulmonary nodules c/w L adenocarcinoma St 1) post op SOB

## 2023-05-24 NOTE — HISTORY OF PRESENT ILLNESS
[TextBox_4] : Ms. Mckinney is 52 year old female with history of migraine HA, thyroid nodule, iron deficiency anemia, transverse myelitis, colonic polyps, COVID 19 x 2, s/p left lung surgery for adenocarcinoma of the lung stage 1 who presents to office for follow up pulmonary evaluation. \par \par -she notes recovering well post lung suegry for CA \par -she notes numbness around the site of surgery \par -she notes weight gain post surgery \par -she notes chest soreness when taking deep breaths \par -she notes intermittent PND and rhinitis \par -she notes walking, hiking and riding bike for exercise\par -she notes recent arthralgia and now taking supplements\par \par -she denies any headaches, nausea, emesis, fever, chills, sweats, chest pain, chest pressure, coughing, wheezing, palpitations, constipation, diarrhea, vertigo, dysphagia, heartburn, reflux, itchy eyes, itchy ears, leg swelling, arthralgias, myalgias, or sour taste in the mouth.

## 2023-05-24 NOTE — PROCEDURE
[FreeTextEntry1] : CXR reveals a normal sized heart;  very minimal post op changes in left lung, no evidence of infiltrate or effusion\par \par Full PFT reveals normal flows; FEV1 was 2.73 L which is 107% of predicted; normal lung volumes; normal diffusion at 22.6, which is 106% of predicted; normal flow volume loop.\par PFTs were performed to evaluate for SOB\par \par

## 2023-05-24 NOTE — ADDENDUM
[FreeTextEntry1] : Documented by Magui Liu acting as a scribe for Dr. Dale Norton on 05/24/2023 .\par \par All medical record entries made by the Scribe were at my, Dr. Dale Norton's, direction and personally dictated by me on 05/24/2023. I have reviewed the chart and agree that the record accurately reflects my personal performance of the history, physical exam, assessment and plan. I have also personally directed, reviewed, and agree with the discharge instructions.

## 2023-05-24 NOTE — ASSESSMENT
[FreeTextEntry1] : Ms. 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 You may also need a radiation oncology evaluation for potential therapy - to be decided by an oncologist, pulmonologist, or thoracic surgeon based on the extent of the disease. \par \par problem 3: cardiac disease\par -recommended to continue to follow up with Cardiologist \par \par problem 4: poor breathing mechanics\par -Proper breathing techniques were reviewed with an emphasis of exhalation. Patient instructed to breath in for 1 second and out for four seconds. Patient was encouraged to not talk while walking. \par \par problem 5:out of shape\par -Weight loss, exercise, and diet control were discussed and are highly encouraged. Treatment options are given such as, aqua therapy, and contacting a nutritionist. Recommended to use the elliptical, stationary bike, less use of treadmill. \par \par problem 6: health maintenance \par -recommended yearly flu shot after October 15\par -recommended strep pneumonia vaccines: Prevnar-20 vaccine, followed by Pneumo vaccine 23 one year following after 65 years old. \par -recommended early intervention for Upper Respiratory Infections (URIs)\par -recommended regular osteoporosis evaluations\par -recommended early dermatological evaluations\par -recommended after the age of 50 to the age of 70, colonoscopy every 5 years\par \par F/U in 6-8 weeks.\par She is encouraged to call with any changes, concerns, or questions

## 2023-06-09 ENCOUNTER — APPOINTMENT (OUTPATIENT)
Dept: INTERNAL MEDICINE | Facility: CLINIC | Age: 53
End: 2023-06-09
Payer: COMMERCIAL

## 2023-06-09 ENCOUNTER — MED ADMIN CHARGE (OUTPATIENT)
Age: 53
End: 2023-06-09

## 2023-06-09 PROCEDURE — 90471 IMMUNIZATION ADMIN: CPT

## 2023-06-09 PROCEDURE — 90750 HZV VACC RECOMBINANT IM: CPT

## 2023-06-29 ENCOUNTER — NON-APPOINTMENT (OUTPATIENT)
Age: 53
End: 2023-06-29

## 2023-06-30 ENCOUNTER — APPOINTMENT (OUTPATIENT)
Dept: RHEUMATOLOGY | Facility: CLINIC | Age: 53
End: 2023-06-30

## 2023-08-06 ENCOUNTER — TRANSCRIPTION ENCOUNTER (OUTPATIENT)
Age: 53
End: 2023-08-06

## 2023-08-09 ENCOUNTER — TRANSCRIPTION ENCOUNTER (OUTPATIENT)
Age: 53
End: 2023-08-09

## 2023-08-09 DIAGNOSIS — M54.2 CERVICALGIA: ICD-10-CM

## 2023-08-09 DIAGNOSIS — M54.9 DORSALGIA, UNSPECIFIED: ICD-10-CM

## 2023-08-09 DIAGNOSIS — G89.29 DORSALGIA, UNSPECIFIED: ICD-10-CM

## 2023-08-15 NOTE — ED STATDOCS - NSFOLLOWUPINSTRUCTIONS_ED_ALL_ED_FT
Concussion    WHAT YOU NEED TO KNOW:    A concussion is a mild brain injury. It is usually caused by a bump or blow to the head from a fall, a motor vehicle crash, or a sports injury. Sometimes being shaken forcefully may cause a concussion.    DISCHARGE INSTRUCTIONS:    Have someone call 911 for any of the following:     Someone tries to wake you and cannot do so.      You have a seizure, increasing confusion, or a change in personality.      Your speech becomes slurred, or you have new vision problems.    Return to the emergency department if:     You have sudden and new vision problems.      You have a severe headache that does not go away.      You have arm or leg weakness, numbness, or new problems with coordination.      You have blood or clear fluid coming out of the ears or nose.    Contact your healthcare provider if:     You have nausea or are vomiting.      You feel more sleepy than usual.      Your symptoms get worse.      Your symptoms last longer than 6 weeks after the injury.      You have questions or concerns about your condition or care.    Medicines: You may need any of the following:     Acetaminophen decreases pain and fever. It is available without a doctor's order. Ask how much to take and how often to take it. Follow directions. Read the labels of all other medicines you are using to see if they also contain acetaminophen, or ask your doctor or pharmacist. Acetaminophen can cause liver damage if not taken correctly. Do not use more than 4 grams (4,000 milligrams) total of acetaminophen in one day.       NSAIDs help decrease swelling and pain or fever. This medicine is available with or without a doctor's order. NSAIDs can cause stomach bleeding or kidney problems in certain people. If you take blood thinner medicine, always ask your healthcare provider if NSAIDs are safe for you. Always read the medicine label and follow directions.      Take your medicine as directed. Contact your healthcare provider if you think your medicine is not helping or if you have side effects. Tell him or her if you are allergic to any medicine. Keep a list of the medicines, vitamins, and herbs you take. Include the amounts, and when and why you take them. Bring the list or the pill bottles to follow-up visits. Carry your medicine list with you in case of an emergency.    Self-care: Concussion symptoms usually go away within about 10 days, but they may last longer. The following may be recommended to manage your symptoms:     Rest from physical and mental activities as directed. Mental activities are those that require thinking, concentration, and attention. You will need to rest until your symptoms are gone. Rest will allow you to recover from your concussion. Ask your healthcare provider when you can return to work and other daily activities.      Have someone stay with you for the first 24 hours after your injury. Your healthcare provider should be contacted if your symptoms get worse, or you develop new symptoms.      Do not participate in sports and physical activities until your healthcare provider says it is okay. They could make your symptoms worse or lead to another concussion. Your healthcare provider will tell you when it is okay for you to return to sports or physical activities. Ask for more information about sports concussions.    Prevent another concussion:     Wear protective sports equipment that fits properly. Helmets help decrease your risk for a serious brain injury. Talk to your healthcare provider about ways you can decrease your risk for a concussion if you play sports.      Wear your seatbelt every time you travel. This helps to decrease your risk for a head injury if you are in a car accident.     Follow up with your healthcare provider as directed: Write down your questions so you remember to ask them during your visits.     FOLLOW UP EVALUATION  To ensure optimal concussion recovery, follow up with a doctor specialized in concussion management. An evaluation by a specialty concussion program can ensure timely return to activities.    We offer appointments through the [facility]’s Hotline at [phone]. 1

## 2023-09-11 ENCOUNTER — APPOINTMENT (OUTPATIENT)
Dept: PULMONOLOGY | Facility: CLINIC | Age: 53
End: 2023-09-11

## 2023-10-02 ENCOUNTER — APPOINTMENT (OUTPATIENT)
Dept: RHEUMATOLOGY | Facility: CLINIC | Age: 53
End: 2023-10-02

## 2023-10-13 ENCOUNTER — APPOINTMENT (OUTPATIENT)
Dept: FAMILY MEDICINE | Facility: CLINIC | Age: 53
End: 2023-10-13
Payer: COMMERCIAL

## 2023-10-13 DIAGNOSIS — Z23 ENCOUNTER FOR IMMUNIZATION: ICD-10-CM

## 2023-10-13 PROCEDURE — 90750 HZV VACC RECOMBINANT IM: CPT

## 2023-10-13 PROCEDURE — 90471 IMMUNIZATION ADMIN: CPT

## 2023-12-26 ENCOUNTER — APPOINTMENT (OUTPATIENT)
Dept: RHEUMATOLOGY | Facility: CLINIC | Age: 53
End: 2023-12-26

## 2024-01-11 ENCOUNTER — APPOINTMENT (OUTPATIENT)
Dept: CARDIOLOGY | Facility: CLINIC | Age: 54
End: 2024-01-11
Payer: COMMERCIAL

## 2024-01-11 VITALS — DIASTOLIC BLOOD PRESSURE: 80 MMHG | SYSTOLIC BLOOD PRESSURE: 130 MMHG

## 2024-01-11 VITALS
DIASTOLIC BLOOD PRESSURE: 87 MMHG | OXYGEN SATURATION: 96 % | HEART RATE: 69 BPM | SYSTOLIC BLOOD PRESSURE: 149 MMHG | BODY MASS INDEX: 28.17 KG/M2 | HEIGHT: 64 IN | WEIGHT: 165 LBS

## 2024-01-11 DIAGNOSIS — R06.09 OTHER FORMS OF DYSPNEA: ICD-10-CM

## 2024-01-11 PROCEDURE — 93000 ELECTROCARDIOGRAM COMPLETE: CPT

## 2024-01-11 PROCEDURE — 99204 OFFICE O/P NEW MOD 45 MIN: CPT

## 2024-01-12 NOTE — DISCUSSION/SUMMARY
[FreeTextEntry1] : This is a 53 year old woman with a history of lung CA s/p wedge resection in 2023 who presents to the office for a cardiac evaluation.  She has started taking better care of herself, and wanted to get a baseline cardiac assessment.  She has not been very active, and thinks that she is out of shape.  I am referring her for an echocardiogram to assess Her cardiac structure and function, as well as an exercise stress test to assess Her hemodynamic response to exercise, and to assess for the presence of obstructive coronary artery disease.   We discussed the benefits of a healthy diet, regular exercise and physical activity, and weight loss in detail.  I will call with the results, and to schedule any necessary followup.  We discussed that her BP is at the upper limits of controlled, and that regular exercise and weight loss will help manage this.  [EKG obtained to assist in diagnosis and management of assessed problem(s)] : EKG obtained to assist in diagnosis and management of assessed problem(s)

## 2024-01-12 NOTE — HISTORY OF PRESENT ILLNESS
[FreeTextEntry1] : This is a 53 year old woman with a history of lung CA s/p wedge resection in 2023 who presents to the office for a cardiac evaluation.  She has started taking better care of herself, and wanted to get a baseline cardiac assessment.  She has not been very active, and thinks that she is out of shape.  She is not exercising regularly, and knows that she needs to start.  She feels that she has increased dyspnea on exertion.  She thinks it is related to deconditioning.  She denies chest pains, PND, orthopnea, LE swelling, dizziness, or syncope.  Her last LDL in April of 2023 was controlled.  She has never seen a cardiologist or had a cardiac evaluation.   She has been under a lot of stress since losing her  to an acute MI in the first wave of COVID.

## 2024-01-18 ENCOUNTER — APPOINTMENT (OUTPATIENT)
Dept: PULMONOLOGY | Facility: CLINIC | Age: 54
End: 2024-01-18
Payer: COMMERCIAL

## 2024-01-18 VITALS
SYSTOLIC BLOOD PRESSURE: 120 MMHG | RESPIRATION RATE: 16 BRPM | DIASTOLIC BLOOD PRESSURE: 80 MMHG | BODY MASS INDEX: 26.46 KG/M2 | OXYGEN SATURATION: 96 % | HEIGHT: 64 IN | WEIGHT: 155 LBS | HEART RATE: 70 BPM | TEMPERATURE: 97.9 F

## 2024-01-18 PROCEDURE — 94727 GAS DIL/WSHOT DETER LNG VOL: CPT

## 2024-01-18 PROCEDURE — 94729 DIFFUSING CAPACITY: CPT

## 2024-01-18 PROCEDURE — 94010 BREATHING CAPACITY TEST: CPT

## 2024-01-18 PROCEDURE — 99214 OFFICE O/P EST MOD 30 MIN: CPT | Mod: 25

## 2024-01-18 NOTE — PHYSICAL EXAM
[No Acute Distress] : no acute distress [Normal Oropharynx] : normal oropharynx [Normal Appearance] : normal appearance [No Neck Mass] : no neck mass [Normal Rate/Rhythm] : normal rate/rhythm [Normal S1, S2] : normal s1, s2 [No Resp Distress] : no resp distress [Clear to Auscultation Bilaterally] : clear to auscultation bilaterally [No Abnormalities] : no abnormalities [Benign] : benign [Normal Gait] : normal gait [FROM] : FROM [Normal Color/ Pigmentation] : normal color/ pigmentation [No Focal Deficits] : no focal deficits [Oriented x3] : oriented x3 [Normal Affect] : normal affect

## 2024-01-19 NOTE — PROCEDURE
[FreeTextEntry1] : Full PFT reveal FEV1 101% of predicted; normal lung volumes; normal diffusion at 19.8, which is 93% of predicted; normal flow volume loop. There is a mild obstructive lung deficit. The airway obstruction is confirmed by the decrease in flow rate at peak flow and flow at 25%, 50%, and 75% of the flow volume curve. lung volumes are within normal limits. Diffusion capacity is within normal limits.

## 2024-01-19 NOTE — REASON FOR VISIT
[Follow-Up] : a follow-up visit [Pre-op Risk Stratification] : pre-op risk stratification [TextBox_44] : routine follow up

## 2024-01-19 NOTE — ASSESSMENT
[FreeTextEntry1] : Problem 1: Clearance for D &C -at this point in time there are no absolute pulmonary contraindications and can move forward with the planned procedure- Dilation and Curettage scheduled for 1/31/2024 -Documents to be faxed to Dr. Sirena Fan ([facility]) Attention: Iris at fax [phone] -at the time of surgery she should have optimal pain control, incentive spirometry, early ambulation, DVT and GI prophylaxis.  Problem 2:  Lung CA adenocarcinoma stage 1 s/p left lung surgery 1/2023 -complete follow up CT every 3-6 months for 2 yrs and then every 6 months for 5 yr and then yearly. Patient is being followed by [facility] for CT scans. Patient will have CT scans sent to office.  -no treatment at this time -All patients with the diagnosis of lung cancer regardless of stage will need to see an oncologist for evaluation as the treatments/prophylaxis is forever changing/evolving. You may also need a radiation oncology evaluation for potential therapy - to be decided by an oncologist, pulmonologist, or thoracic surgeon based on the extent of the disease.  Follow up in 6 months Advised to call office for any issues or concerns.

## 2024-01-29 ENCOUNTER — NON-APPOINTMENT (OUTPATIENT)
Age: 54
End: 2024-01-29

## 2024-01-29 ENCOUNTER — APPOINTMENT (OUTPATIENT)
Dept: FAMILY MEDICINE | Facility: CLINIC | Age: 54
End: 2024-01-29
Payer: COMMERCIAL

## 2024-01-29 DIAGNOSIS — E87.5 HYPERKALEMIA: ICD-10-CM

## 2024-01-29 DIAGNOSIS — Z01.818 ENCOUNTER FOR OTHER PREPROCEDURAL EXAMINATION: ICD-10-CM

## 2024-01-29 DIAGNOSIS — N95.0 POSTMENOPAUSAL BLEEDING: ICD-10-CM

## 2024-01-29 DIAGNOSIS — N92.0 EXCESSIVE AND FREQUENT MENSTRUATION WITH REGULAR CYCLE: ICD-10-CM

## 2024-01-29 DIAGNOSIS — D25.9 LEIOMYOMA OF UTERUS, UNSPECIFIED: ICD-10-CM

## 2024-01-29 PROCEDURE — 36415 COLL VENOUS BLD VENIPUNCTURE: CPT

## 2024-01-29 PROCEDURE — 99214 OFFICE O/P EST MOD 30 MIN: CPT

## 2024-01-30 LAB
ANION GAP SERPL CALC-SCNC: 13 MMOL/L
BUN SERPL-MCNC: 18 MG/DL
CALCIUM SERPL-MCNC: 10.1 MG/DL
CHLORIDE SERPL-SCNC: 101 MMOL/L
CO2 SERPL-SCNC: 27 MMOL/L
CREAT SERPL-MCNC: 0.98 MG/DL
EGFR: 68 ML/MIN/1.73M2
GLUCOSE SERPL-MCNC: 105 MG/DL
POTASSIUM SERPL-SCNC: 4.3 MMOL/L
SODIUM SERPL-SCNC: 141 MMOL/L

## 2024-01-30 NOTE — ASSESSMENT
[Patient Optimized for Surgery] : Patient optimized for surgery [No Further Testing Recommended] : no further testing recommended [As per surgery] : as per surgery [FreeTextEntry4] : MAYTE LYN is a 53 year old female here for pre-operative evaluation prior to D&C and uterine fibroid excision on 1/31/24  Preop labs sent incl nl CBC , O+ blood type, and BMP which is wnl except for K+ 5.6. She is not on any meds and does not have any medical issues that should cause elevated potassium so likely this is a false positive. Will repeat potassium today. If wnl she is optimized for surgery this week as scheduled  ECG 1/11/2024 wnl NSR at recent card visit. No need to repeat ECG at this time. No indication for any further card testing prior to this low cardiac risk surgery

## 2024-01-30 NOTE — PHYSICAL EXAM
[No Acute Distress] : no acute distress [Well Nourished] : well nourished [Well Developed] : well developed [Well-Appearing] : well-appearing [Normal Sclera/Conjunctiva] : normal sclera/conjunctiva [EOMI] : extraocular movements intact [Normal Outer Ear/Nose] : the outer ears and nose were normal in appearance [No JVD] : no jugular venous distention [No Lymphadenopathy] : no lymphadenopathy [Supple] : supple [Thyroid Normal, No Nodules] : the thyroid was normal and there were no nodules present [No Respiratory Distress] : no respiratory distress  [No Accessory Muscle Use] : no accessory muscle use [Clear to Auscultation] : lungs were clear to auscultation bilaterally [Normal Rate] : normal rate  [Regular Rhythm] : with a regular rhythm [Normal S1, S2] : normal S1 and S2 [No Murmur] : no murmur heard [No Carotid Bruits] : no carotid bruits [No Varicosities] : no varicosities [Pedal Pulses Present] : the pedal pulses are present [No Edema] : there was no peripheral edema [No Extremity Clubbing/Cyanosis] : no extremity clubbing/cyanosis [Soft] : abdomen soft [Non Tender] : non-tender [Non-distended] : non-distended [No Masses] : no abdominal mass palpated [No HSM] : no HSM [Normal Bowel Sounds] : normal bowel sounds [No Joint Swelling] : no joint swelling [Grossly Normal Strength/Tone] : grossly normal strength/tone [No Rash] : no rash [Coordination Grossly Intact] : coordination grossly intact [No Focal Deficits] : no focal deficits [Normal Gait] : normal gait [Normal Affect] : the affect was normal [Normal Insight/Judgement] : insight and judgment were intact [Normal Posterior Cervical Nodes] : no posterior cervical lymphadenopathy [Normal Anterior Cervical Nodes] : no anterior cervical lymphadenopathy [de-identified] : no s/sxs acute synovitis [de-identified] : VS not importing above and are as follows; 120/80, 97.9, HR 70, Hgt 64 in, wgt 155, O2 sat 96% RA [de-identified] : well healing (3 small, about 1-2 inch long) surgical scars L flank/thorax [de-identified] : good eye contact

## 2024-01-30 NOTE — ADDENDUM
[FreeTextEntry1] : Repeat potassium is normal (so original pre-op lab potassium elevation was a false positive as suspected) and rest of nonfasting BMP is wnl. Glenny is all set to proceed with surgery tomorrow as scheduled.  Glenny is optimized for surgery.

## 2024-01-30 NOTE — PLAN
[FreeTextEntry1] : Based on examination there are no acute contraindications to proceeding with above listed surgery at this time pending review of repeat potassium results. Advised patient to avoid all NSAIDs/ vitamins/ supplements for 1 week prior to surgery and may resume these post-operatively when advised okay to do so by surgeon. Reviewed risks of constipation and DVT post operatively and ways to decrease risk for these issues, including limit frequency/duration of opioid medication, push fluids/fiber, +/- Miralax, +/- stool softener and/or Dulcolax (short term), early and frequent mobilization.  Continue all medications as prescribed. Repeat potassium level today to confirm is actually normal as I suspect will be the case.   Discussed clean eating (eg Mediterranean style eating plan) and regular exercise/staying as physically active as possible. Include balance exercises and strength training and core strengthening exercises for bone health and to decrease risk for falls.  Reviewed importance of good self care (e.g. meditation, yoga, adequate rest, regular exercise, magnesium, clean eating, etc.).  Cont f/u with specialists as recommended by them.  Follow up as scheduled 4/2024 for CPE visit.   Face-to-face time spent with patient, over half in discussion of the above diagnoses and treatment plan: 30 minutes.

## 2024-01-30 NOTE — REVIEW OF SYSTEMS
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  [de-identified] : migraines approx 2x/mo, Rx med regimen prn works well

## 2024-01-30 NOTE — HISTORY OF PRESENT ILLNESS
[(Patient denies any chest pain, claudication, dyspnea on exertion, orthopnea, palpitations or syncope)] : Patient denies any chest pain, claudication, dyspnea on exertion, orthopnea, palpitations or syncope [Good (7-10 METs)] : Good (7-10 METs) [Aortic Stenosis] : no aortic stenosis [Atrial Fibrillation] : no atrial fibrillation [Coronary Artery Disease] : no coronary artery disease [Recent Myocardial Infarction] : no recent myocardial infarction [Implantable Device/Pacemaker] : no implantable device/pacemaker [Asthma] : no asthma [COPD] : no COPD [Sleep Apnea] : no sleep apnea [Smoker] : not a smoker [Family Member] : no family member with adverse anesthesia reaction/sudden death [Self] : no previous adverse anesthesia reaction [Chronic Anticoagulation] : no chronic anticoagulation [Chronic Kidney Disease] : no chronic kidney disease [FreeTextEntry1] : D&C and fibroid excision [Diabetes] : no diabetes [FreeTextEntry2] : 1/31/24 at [facility]  [FreeTextEntry3] : Dr. Fan [FreeTextEntry4] : Glenny has migraines, iron deficiency anemia, oral HSV, thyroid nodule, and early stage lung cancer (left, dx 2022, s/p JAYA segmentectomy 1/2023, no adjuvant therapy required)  Saw Dr. Carranza 11/2023 for routine gyn check. Is perimenopausal and still gets occas menses. Had a heavier than usual period just prior to the gyn visit.. Dr. Carranza  ordered pelvic US. to eval further given heavy flow with clots and was long lasting 8-10 days., Pelvic US 12/2023shows 1.4 cm endometrial thickness with poss endometrial polyp and also fibroids.  Dr. Carranza revd with her and recommended D&C and excision of fibroids.   She is here today for pre-op visit prior to D&C and fibroid excision on Wed 1/31/24 with Dr. Fan for evaluation of post menopausal bleeding.   She had pre-op testing that showed K+ of 5.6. Was notified by gyn team that this needs to be revd with PMD and addressed/repeated in order to proceed with surgery. We will repeat this today and we revd that this potassium result is likely a false positive. Pre-op testing and surg are at [facility].    She saw Dr. Fay (card) as was just interested in baseline card eval. No cardiac sxs at this time. He  feels that it is less likely that she has any underlying cardiac disease; has scheduled her for Echo and stress test in near future to serve as baseline exams. She has been working out with a  and doing cardio and strength training and has not had any cardiac sxs   [FreeTextEntry5] : see above for pertinent PMH [FreeTextEntry7] : ECG 1/2024 with card NSR, nl ECG

## 2024-03-05 ENCOUNTER — APPOINTMENT (OUTPATIENT)
Dept: CARDIOLOGY | Facility: CLINIC | Age: 54
End: 2024-03-05
Payer: COMMERCIAL

## 2024-03-05 PROCEDURE — 93015 CV STRESS TEST SUPVJ I&R: CPT

## 2024-03-05 PROCEDURE — 93306 TTE W/DOPPLER COMPLETE: CPT

## 2024-03-07 DIAGNOSIS — R94.31 ABNORMAL ELECTROCARDIOGRAM [ECG] [EKG]: ICD-10-CM

## 2024-03-12 ENCOUNTER — NON-APPOINTMENT (OUTPATIENT)
Age: 54
End: 2024-03-12

## 2024-03-12 ENCOUNTER — TRANSCRIPTION ENCOUNTER (OUTPATIENT)
Age: 54
End: 2024-03-12

## 2024-03-18 ENCOUNTER — RESULT REVIEW (OUTPATIENT)
Age: 54
End: 2024-03-18

## 2024-03-18 ENCOUNTER — APPOINTMENT (OUTPATIENT)
Dept: RHEUMATOLOGY | Facility: CLINIC | Age: 54
End: 2024-03-18
Payer: COMMERCIAL

## 2024-03-18 VITALS
TEMPERATURE: 97.6 F | HEIGHT: 64 IN | BODY MASS INDEX: 23.99 KG/M2 | HEART RATE: 78 BPM | OXYGEN SATURATION: 98 % | DIASTOLIC BLOOD PRESSURE: 70 MMHG | WEIGHT: 140.5 LBS | SYSTOLIC BLOOD PRESSURE: 110 MMHG

## 2024-03-18 DIAGNOSIS — Z13.820 ENCOUNTER FOR SCREENING FOR OSTEOPOROSIS: ICD-10-CM

## 2024-03-18 DIAGNOSIS — H04.123 DRY EYE SYNDROME OF BILATERAL LACRIMAL GLANDS: ICD-10-CM

## 2024-03-18 PROCEDURE — 99204 OFFICE O/P NEW MOD 45 MIN: CPT

## 2024-03-19 PROBLEM — H04.123 CHRONICALLY DRY EYES, BILATERAL: Status: ACTIVE | Noted: 2024-03-18

## 2024-03-19 NOTE — REVIEW OF SYSTEMS
[As Noted in HPI] : as noted in HPI [Arthralgias] : arthralgias [Joint Pain] : joint pain [Joint Swelling] : no joint swelling [Joint Stiffness] : no joint stiffness [Negative] : Integumentary

## 2024-03-19 NOTE — HISTORY OF PRESENT ILLNESS
[FreeTextEntry1] : MAYTE LYN is a 54 year old woman who presents for rheum eval for acute onset symmetrical, small joint arthralgias, + AM stiffness in late Spring 2023, no overt synovitis but very hard to do ADLs for a time, no infectious triggers but was just s/p lobectomy and LN resection for breast cancer, joints worsened as healing from that. Since then has self improved by >50%. She has now returned to the gym, and exercise and weight loss has added some milder ongoing improvement. Hasn't needed OTC pain meds. Inflammatory arthritis ROS negative for symmetrical peripheral joint synovitis, prolonged AM stiffness, enthesitis, dactylitis, psoriasis/ rashes, eye inflammation, inflammatory low back pain, IBD. Currently hands and knees most active, don't limit ADLs but hard to arise from floor. + bony nodule over fingers, palmar nodules in Dupuytren's contractures   SLE ROS negative for focal alopecia, sicca, salivary gland swelling, oral ulcers, malar rash, photosensitivity, SOB, chest pain, serositis, abd pain, dysuria, hematuria, rash, joint AM stiffness/synovitis, hematologic abnormalities, Raynauds. APLS ROS - 1 uncomplicated pregnancy, no miscarriages, no thrombotic events.   Labs - Negative 4/2023 -- MACIE, ESR/CRP, RF, Lyme, Iron studies  Negative FH for autoimmune d/o but mother with advanced DJD and hand OA   + Last period last year, D&C 2/2 thick uterine lining -- asking about beginning OP screening  + Transverse myelitis remotely, felt to be infection mediated as never recurred, imaging/ LP negative for MS

## 2024-03-19 NOTE — ASSESSMENT
[FreeTextEntry1] : MAYTE LYN is a 54 year old woman with below --   # current b/l knee and hand pain, former appears more muscle mediated and latter appears more OA but some palmar nodules and tendon thickening and hx of AM stiffness from last year warrants further w/u - 2023 screening rheum labs negative - round out/repeat as below  - XR/MSK US b/l hands, XR knees - c/w exercising - can c/w knee bracing if gets benefit - pt prefers to avoid PO meds, can try topical Voltaren gel prn pain up to TID to affected peripheral joints for OA -related pain  - if develops true flexor contraactures, would refer to ortho hand  - PT referral  # post menopausal screening for OP - check DEXA  - reviewed recommendation for dietary Ca 600mg BID with food, supplemental Vit D 1000 IU daily - C/w weight bearing exercise for goal of 30min 3-4x/week to maintain BMD - modalities reviewed with patient   All questions and concerns addressed to my best possible ability, patient verbalizes understanding and is agreeable. RTC 4 months

## 2024-03-19 NOTE — PHYSICAL EXAM
[General Appearance - Alert] : alert [General Appearance - In No Acute Distress] : in no acute distress [General Appearance - Well Nourished] : well nourished [PERRL With Normal Accommodation] : pupils were equal in size, round, and reactive to light [Sclera] : the sclera and conjunctiva were normal [Extraocular Movements] : extraocular movements were intact [Oriented To Time, Place, And Person] : oriented to person, place, and time [Impaired Insight] : insight and judgment were intact [Affect] : the affect was normal [Outer Ear] : the ears and nose were normal in appearance [Oropharynx] : the oropharynx was normal [Neck Appearance] : the appearance of the neck was normal [Auscultation Breath Sounds / Voice Sounds] : lungs were clear to auscultation bilaterally [Heart Rate And Rhythm] : heart rate was normal and rhythm regular [Heart Sounds] : normal S1 and S2 [Murmurs] : no murmurs [Edema] : there was no peripheral edema [No Spinal Tenderness] : no spinal tenderness [Abnormal Walk] : normal gait [Nail Clubbing] : no clubbing  or cyanosis of the fingernails [Musculoskeletal - Swelling] : no joint swelling seen [Motor Tone] : muscle strength and tone were normal [] : no rash [No Focal Deficits] : no focal deficits [FreeTextEntry1] : No synovitis/effusions, knee ROM intact but some muscle tightness over quads. + Dupuytren's thickening but no contractures in b/l hands with some palmar nodules

## 2024-03-20 LAB
C3 SERPL-MCNC: 79 MG/DL
C4 SERPL-MCNC: 27 MG/DL
RHEUMATOID FACT SER QL: <10 IU/ML

## 2024-03-21 LAB
CRP SERPL-MCNC: <3 MG/L
ERYTHROCYTE [SEDIMENTATION RATE] IN BLOOD BY WESTERGREN METHOD: < 2 MM/HR

## 2024-03-22 ENCOUNTER — NON-APPOINTMENT (OUTPATIENT)
Age: 54
End: 2024-03-22

## 2024-03-22 LAB
CCP AB SER IA-ACNC: <8 UNITS
ENA SS-A AB SER IA-ACNC: <0.2 AL
ENA SS-B AB SER IA-ACNC: <0.2 AL
RF+CCP IGG SER-IMP: NEGATIVE

## 2024-03-25 ENCOUNTER — APPOINTMENT (OUTPATIENT)
Dept: CARDIOLOGY | Facility: CLINIC | Age: 54
End: 2024-03-25
Payer: COMMERCIAL

## 2024-03-25 PROCEDURE — 93015 CV STRESS TEST SUPVJ I&R: CPT

## 2024-03-25 PROCEDURE — 78452 HT MUSCLE IMAGE SPECT MULT: CPT

## 2024-03-25 PROCEDURE — A9500: CPT

## 2024-03-27 ENCOUNTER — APPOINTMENT (OUTPATIENT)
Dept: RADIOLOGY | Facility: CLINIC | Age: 54
End: 2024-03-27
Payer: COMMERCIAL

## 2024-03-27 ENCOUNTER — OUTPATIENT (OUTPATIENT)
Dept: OUTPATIENT SERVICES | Facility: HOSPITAL | Age: 54
LOS: 1 days | End: 2024-03-27
Payer: COMMERCIAL

## 2024-03-27 DIAGNOSIS — M79.641 PAIN IN RIGHT HAND: ICD-10-CM

## 2024-03-27 DIAGNOSIS — Z13.820 ENCOUNTER FOR SCREENING FOR OSTEOPOROSIS: ICD-10-CM

## 2024-03-27 DIAGNOSIS — M25.561 PAIN IN RIGHT KNEE: ICD-10-CM

## 2024-03-27 LAB
ANA SER IF-ACNC: NEGATIVE
G6PD SER-CCNC: 14.9 U/G HGB

## 2024-03-27 PROCEDURE — 77080 DXA BONE DENSITY AXIAL: CPT | Mod: 26

## 2024-03-27 PROCEDURE — 73130 X-RAY EXAM OF HAND: CPT | Mod: 26,RT

## 2024-03-27 PROCEDURE — 77080 DXA BONE DENSITY AXIAL: CPT

## 2024-03-27 PROCEDURE — 73562 X-RAY EXAM OF KNEE 3: CPT | Mod: 26,LT,76

## 2024-03-27 PROCEDURE — 73562 X-RAY EXAM OF KNEE 3: CPT

## 2024-03-27 PROCEDURE — 73562 X-RAY EXAM OF KNEE 3: CPT | Mod: 26,RT

## 2024-03-27 PROCEDURE — 73130 X-RAY EXAM OF HAND: CPT | Mod: 26,LT,76

## 2024-03-27 PROCEDURE — 73130 X-RAY EXAM OF HAND: CPT

## 2024-04-16 ENCOUNTER — APPOINTMENT (OUTPATIENT)
Dept: FAMILY MEDICINE | Facility: CLINIC | Age: 54
End: 2024-04-16
Payer: COMMERCIAL

## 2024-04-16 VITALS
TEMPERATURE: 97.6 F | DIASTOLIC BLOOD PRESSURE: 82 MMHG | WEIGHT: 141 LBS | OXYGEN SATURATION: 98 % | HEART RATE: 76 BPM | HEIGHT: 64 IN | SYSTOLIC BLOOD PRESSURE: 110 MMHG | BODY MASS INDEX: 24.07 KG/M2

## 2024-04-16 DIAGNOSIS — Z85.118 PERSONAL HISTORY OF OTHER MALIGNANT NEOPLASM OF BRONCHUS AND LUNG: ICD-10-CM

## 2024-04-16 DIAGNOSIS — G43.909 MIGRAINE, UNSPECIFIED, NOT INTRACTABLE, W/OUT STATUS MIGRAINOSUS: ICD-10-CM

## 2024-04-16 DIAGNOSIS — E04.2 NONTOXIC MULTINODULAR GOITER: ICD-10-CM

## 2024-04-16 DIAGNOSIS — Z00.00 ENCOUNTER FOR GENERAL ADULT MEDICAL EXAMINATION W/OUT ABNORMAL FINDINGS: ICD-10-CM

## 2024-04-16 PROCEDURE — 36415 COLL VENOUS BLD VENIPUNCTURE: CPT

## 2024-04-16 PROCEDURE — 99396 PREV VISIT EST AGE 40-64: CPT

## 2024-04-16 RX ORDER — VALACYCLOVIR 1 G/1
1 TABLET, FILM COATED ORAL
Qty: 20 | Refills: 3 | Status: ACTIVE | COMMUNITY
Start: 1900-01-01 | End: 1900-01-01

## 2024-04-16 NOTE — HEALTH RISK ASSESSMENT
[0] : 2) Feeling down, depressed, or hopeless: Not at all (0) [PHQ-2 Negative - No further assessment needed] : PHQ-2 Negative - No further assessment needed [Never] : Never [[id number]] : 0

## 2024-04-16 NOTE — ASSESSMENT
[FreeTextEntry1] : MAYTE LYN is a 54 year old female here for a physical exam.  She is also here to follow up on medical issues as noted above.

## 2024-04-16 NOTE — HISTORY OF PRESENT ILLNESS
[FreeTextEntry1] : MAYTE LYN is a 54 year old female here for a physical exam. [de-identified] : Her last physical exam was last year  Vaccines: Tetanus is up to date, Tdap 1/2020 Pneumococcal vaccination is NOT up to date, should consider dos of PCV 20 in light of h/o lung cancer and she can d/w her pulm team to get their input as well  Shingrix is up to date  Her last dentist visit was less than one year ago Her last eye doctor appointment was less than one year ago Her last dermatologist visit was less than one year ago  GYN visit is up to date, 11/2023 Dr. Carranza. See prior notes for details.Had abnormal vaginal bleeding and eval of this showed thickened endometrial lining with possible polyp and also uterine fibroids. She had D&C and fibroid excision with Dr. Fan in 1/2024 and has recovered well from this. Final pathology was benign  Mammogram is up to date and due again now, 4/2023 stable/benign mammogram/US [facility] Rad Colon cancer screening is up to date, colonoscopy at age 50-51, 5yr f/u recommended. Also had EGD and was wnl (Dr. Bliss). She has pre-colonoscopy consult in May/June 2024 with Dr. CLAROS  DEXA is up to date, 4/2024 normal bone density  Her diet is healthy overall Exercise: regular walking, also stretching/strengthening exercises with  as goal is to improve strength    Glenny has migraines, iron deficiency anemia, oral HSV, thyroid nodule, and early stage lung cancer (left, dx 2022, s/p JAYA segmentectomy 1/2023, no adjuvant therapy required), OA, thyroid nodule. She was noted to have mild ascending aortic dilatation on Echo 3/2024   Pulm Dr. Norton, UTD with him  She has had card eval and testing (baseline eval) in past year with Dr. Fay. Nuclear stress test was wnl. Echo 3/2024 showed nl LVEF 58%. Mild dilatation of Ascending aorta, minimal AI, mild thickening of aortic valve leaflets, tr MR. She was advised that Echo results were normal.   Glenny also saw Dr. Ann for baseline rheum eval of her diffuse joint pains. She feels her sxs are more likely related to muscular and OA etiology. Labs to check for Rheum/autoimmune etiology were wnl/negative. XR were ok (B knees/hands). US hands still pending   5/2023 thyroid US showed multiple benign appearing cysts/nodules, overall stable in size/appearance (and one cyst/nodule decreased in size from 1.4 cm to 0.9 cm). Rpt US will be due 5/2024 and order in place and she will schedule

## 2024-04-16 NOTE — PHYSICAL EXAM
[No Acute Distress] : no acute distress [Well Nourished] : well nourished [Well Developed] : well developed [Well-Appearing] : well-appearing [Normal Sclera/Conjunctiva] : normal sclera/conjunctiva [EOMI] : extraocular movements intact [Normal Outer Ear/Nose] : the outer ears and nose were normal in appearance [Normal Oropharynx] : the oropharynx was normal [No JVD] : no jugular venous distention [No Lymphadenopathy] : no lymphadenopathy [Supple] : supple [Thyroid Normal, No Nodules] : the thyroid was normal and there were no nodules present [No Respiratory Distress] : no respiratory distress  [No Accessory Muscle Use] : no accessory muscle use [Clear to Auscultation] : lungs were clear to auscultation bilaterally [Normal Rate] : normal rate  [Regular Rhythm] : with a regular rhythm [Normal S1, S2] : normal S1 and S2 [No Murmur] : no murmur heard [No Carotid Bruits] : no carotid bruits [No Varicosities] : no varicosities [Pedal Pulses Present] : the pedal pulses are present [No Edema] : there was no peripheral edema [No Extremity Clubbing/Cyanosis] : no extremity clubbing/cyanosis [Soft] : abdomen soft [Non Tender] : non-tender [Non-distended] : non-distended [No Masses] : no abdominal mass palpated [No HSM] : no HSM [Normal Bowel Sounds] : normal bowel sounds [Normal Posterior Cervical Nodes] : no posterior cervical lymphadenopathy [Normal Anterior Cervical Nodes] : no anterior cervical lymphadenopathy [No Joint Swelling] : no joint swelling [Grossly Normal Strength/Tone] : grossly normal strength/tone [No Rash] : no rash [Coordination Grossly Intact] : coordination grossly intact [No Focal Deficits] : no focal deficits [Normal Gait] : normal gait [Normal Affect] : the affect was normal [Normal Insight/Judgement] : insight and judgment were intact [de-identified] : no s/sxs acute synovitis [de-identified] : well healing (3 small, about 1-2 inch long) surgical scars L flank/thorax [de-identified] : good eye contact

## 2024-04-16 NOTE — REVIEW OF SYSTEMS
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 Ann for eval to make antonella enot an autoimmune issue [de-identified] : .  [de-identified] : migraines approx 2x/mo, Rx med regimen prn works well

## 2024-04-16 NOTE — PLAN
[FreeTextEntry1] : Continue all medications as prescribed. Check labs as above. Will adjust any medications based upon lab results.  Reviewed age-appropriate preventive screening tests with patient. UTD on gyn exam, mammogram, DEXA screening. We revd that she is likely due for repeat colonoscopy this year or next and she notes that she has an appt with Dr. Bliss in May/June 2024 for pre-colonoscopy consult. She will request that he send colonoscopy results here  Thyroid US (f/u nodules) will be due 5.2024. Order in place and she will schedule  Revd/recommended PCV 20 dose given h/o lung cancer in 2022. She can d/w her pulm team as well to get their input/advice.   BP goal is <=135/85 on average on home checks. Advised to let us know if BPs are above goal on home checks.   Lifestyle measures to optimize BP reviewed, including regular exercise, adequate sleep, Mediterranean or DASH style clean eating, mindfulness/meditation, magnesium 100-400 mg supplementation, avoid NSAIDS, stress management techniques etc.  Revd Echo results and revd importance of clean eating, regular exercise, achieve/maintain healthy weight, optimize BP in light of dilated ascending aorta. Revd that she should d/w card re this finding on echo and whether requires any further treatment aside from above (eg trial of beta blocker med?). Great job with wgt loss and healthier lifestyle over past 6+ moskeep up the good work.   Discussed clean eating (eg Mediterranean style plant based eating plan recommended) and regular exercise/staying as physically active as possible.  Include balance exercises and strength training and core strengthening exercises for bone health and to decrease risk for falls.  Recommended Tylenol XS or Arthritis 1-2 pills BID-TID if helpful, ok to use NSAIDs sparingly with food (but revd r/b/se of NSAIDs incl CV, renal and GI and she should limit use as much as possible), regular stretching, heat/ice prn, consider turmeric supplementation, consider CBD cream or oral options, gentle yoga/chair yoga, Pilates, strengthen core muscles, consider chiro and/or massage and/or acupuncture. If these measures are not helpful enough then consider consultation with ortho and/or pain  for further eval and treatment.   Reviewed importance of good self care (e.g. meditation, yoga, adequate rest, regular exercise, magnesium, clean eating, etc.).  Follow up with specialists as recommended by them.   Follow up for next physical in one year.

## 2024-04-18 DIAGNOSIS — E61.1 IRON DEFICIENCY: ICD-10-CM

## 2024-04-18 DIAGNOSIS — R73.09 OTHER ABNORMAL GLUCOSE: ICD-10-CM

## 2024-04-18 LAB
ALBUMIN SERPL ELPH-MCNC: 4.6 G/DL
ALP BLD-CCNC: 48 U/L
ALT SERPL-CCNC: 12 U/L
ANION GAP SERPL CALC-SCNC: 11 MMOL/L
AST SERPL-CCNC: 15 U/L
BASOPHILS # BLD AUTO: 0.04 K/UL
BASOPHILS NFR BLD AUTO: 1.1 %
BILIRUB SERPL-MCNC: 0.4 MG/DL
BUN SERPL-MCNC: 11 MG/DL
CALCIUM SERPL-MCNC: 9.9 MG/DL
CHLORIDE SERPL-SCNC: 106 MMOL/L
CHOLEST SERPL-MCNC: 162 MG/DL
CO2 SERPL-SCNC: 28 MMOL/L
CREAT SERPL-MCNC: 0.9 MG/DL
EGFR: 75 ML/MIN/1.73M2
EOSINOPHIL # BLD AUTO: 0.15 K/UL
EOSINOPHIL NFR BLD AUTO: 4 %
GLUCOSE SERPL-MCNC: 102 MG/DL
HCT VFR BLD CALC: 38.8 %
HDLC SERPL-MCNC: 74 MG/DL
HGB BLD-MCNC: 12.2 G/DL
IMM GRANULOCYTES NFR BLD AUTO: 0.3 %
LDLC SERPL CALC-MCNC: 76 MG/DL
LYMPHOCYTES # BLD AUTO: 0.94 K/UL
LYMPHOCYTES NFR BLD AUTO: 24.8 %
MAN DIFF?: NORMAL
MCHC RBC-ENTMCNC: 28.4 PG
MCHC RBC-ENTMCNC: 31.4 GM/DL
MCV RBC AUTO: 90.2 FL
MONOCYTES # BLD AUTO: 0.21 K/UL
MONOCYTES NFR BLD AUTO: 5.5 %
NEUTROPHILS # BLD AUTO: 2.44 K/UL
NEUTROPHILS NFR BLD AUTO: 64.3 %
NONHDLC SERPL-MCNC: 88 MG/DL
PLATELET # BLD AUTO: 209 K/UL
POTASSIUM SERPL-SCNC: 4.6 MMOL/L
PROT SERPL-MCNC: 6.1 G/DL
RBC # BLD: 4.3 M/UL
RBC # FLD: 14.2 %
SODIUM SERPL-SCNC: 144 MMOL/L
TRIGL SERPL-MCNC: 62 MG/DL
TSH SERPL-ACNC: 1.42 UIU/ML
WBC # FLD AUTO: 3.79 K/UL

## 2024-04-21 LAB
ESTIMATED AVERAGE GLUCOSE: 105 MG/DL
FERRITIN SERPL-MCNC: 67 NG/ML
HBA1C MFR BLD HPLC: 5.3 %
IRON SATN MFR SERPL: 34 %
IRON SERPL-MCNC: 99 UG/DL
TIBC SERPL-MCNC: 287 UG/DL
UIBC SERPL-MCNC: 189 UG/DL

## 2024-04-24 ENCOUNTER — TRANSCRIPTION ENCOUNTER (OUTPATIENT)
Age: 54
End: 2024-04-24

## 2024-05-14 ENCOUNTER — APPOINTMENT (OUTPATIENT)
Dept: ULTRASOUND IMAGING | Facility: CLINIC | Age: 54
End: 2024-05-14
Payer: COMMERCIAL

## 2024-05-14 ENCOUNTER — OUTPATIENT (OUTPATIENT)
Dept: OUTPATIENT SERVICES | Facility: HOSPITAL | Age: 54
LOS: 1 days | End: 2024-05-14
Payer: COMMERCIAL

## 2024-05-14 DIAGNOSIS — M25.561 PAIN IN RIGHT KNEE: ICD-10-CM

## 2024-05-14 PROCEDURE — 76881 US COMPL JOINT R-T W/IMG: CPT

## 2024-05-14 PROCEDURE — 76881 US COMPL JOINT R-T W/IMG: CPT | Mod: 26,LT

## 2024-06-06 ENCOUNTER — RX RENEWAL (OUTPATIENT)
Age: 54
End: 2024-06-06

## 2024-06-12 ENCOUNTER — TRANSCRIPTION ENCOUNTER (OUTPATIENT)
Age: 54
End: 2024-06-12

## 2024-06-12 RX ORDER — ZOLMITRIPTAN 5 MG/1
5 TABLET, FILM COATED ORAL
Qty: 36 | Refills: 2 | Status: ACTIVE | COMMUNITY
Start: 2024-06-06 | End: 1900-01-01

## 2024-06-24 ENCOUNTER — APPOINTMENT (OUTPATIENT)
Dept: RHEUMATOLOGY | Facility: CLINIC | Age: 54
End: 2024-06-24
Payer: COMMERCIAL

## 2024-06-24 VITALS
DIASTOLIC BLOOD PRESSURE: 76 MMHG | OXYGEN SATURATION: 98 % | BODY MASS INDEX: 23.05 KG/M2 | SYSTOLIC BLOOD PRESSURE: 122 MMHG | HEIGHT: 64 IN | WEIGHT: 135 LBS | TEMPERATURE: 97.5 F | HEART RATE: 67 BPM

## 2024-06-24 DIAGNOSIS — M25.561 PAIN IN RIGHT KNEE: ICD-10-CM

## 2024-06-24 DIAGNOSIS — M25.562 PAIN IN RIGHT KNEE: ICD-10-CM

## 2024-06-24 DIAGNOSIS — M79.641 PAIN IN RIGHT HAND: ICD-10-CM

## 2024-06-24 DIAGNOSIS — M79.642 PAIN IN RIGHT HAND: ICD-10-CM

## 2024-06-24 DIAGNOSIS — M72.0 PALMAR FASCIAL FIBROMATOSIS [DUPUYTREN]: ICD-10-CM

## 2024-06-24 DIAGNOSIS — G89.29 PAIN IN RIGHT KNEE: ICD-10-CM

## 2024-06-24 DIAGNOSIS — R77.8 OTHER SPECIFIED ABNORMALITIES OF PLASMA PROTEINS: ICD-10-CM

## 2024-06-24 PROCEDURE — 99214 OFFICE O/P EST MOD 30 MIN: CPT

## 2024-08-30 ENCOUNTER — TRANSCRIPTION ENCOUNTER (OUTPATIENT)
Age: 54
End: 2024-08-30

## 2024-08-30 ENCOUNTER — RX RENEWAL (OUTPATIENT)
Age: 54
End: 2024-08-30

## 2024-11-07 ENCOUNTER — APPOINTMENT (OUTPATIENT)
Dept: PULMONOLOGY | Facility: CLINIC | Age: 54
End: 2024-11-07

## 2024-11-11 NOTE — HISTORY OF PRESENT ILLNESS
[TextBox_4] : Ms. Mckinney is 52 year old female with history of migraine HA, thyroid nodule, iron deficiency anemia, transverse myelitis, colonic polyps, COVID 19 x 2, s/p left lung surgery for adenocarcinoma of the lung stage 1 January 2023.   Patient reports she is recovering well post lung surgery. States she is in her regular state of health. Denies dyspnea, chest pain, pressure, or palpitations.   Patient presents to office for clearance for scheduled D&C with Dr. Sirena Fan [phone] fax [phone] at [facility]).  
Refer to MDM

## 2025-05-12 ENCOUNTER — APPOINTMENT (OUTPATIENT)
Dept: FAMILY MEDICINE | Facility: CLINIC | Age: 55
End: 2025-05-12